# Patient Record
Sex: FEMALE | Race: AMERICAN INDIAN OR ALASKA NATIVE
[De-identification: names, ages, dates, MRNs, and addresses within clinical notes are randomized per-mention and may not be internally consistent; named-entity substitution may affect disease eponyms.]

---

## 2020-09-11 NOTE — XRAY REPORT
CHEST 2 VIEWS 



INDICATION:  COUGH.



COMPARISON:  7/10/2018



FINDINGS:

Support devices: None.



Heart: Within normal limits. 

Lungs/pleura: Interstitial markings are slightly prominent bilaterally particularly in the lung bases
 suggesting mild fibrotic changes.  No infiltrate, pleural effusion or pneumothorax.



Additional findings: None.



IMPRESSION:

Mild fibrotic changes as described. No acute process.



Signer Name: Aki Cruz Jr, MD 

Signed: 9/11/2020 3:57 PM

Workstation Name: [id number]

## 2021-07-26 NOTE — XRAY REPORT
CHEST 2 VIEWS 



INDICATION / CLINICAL INFORMATION: PULMONARY FIBROSIS.



COMPARISON: 09/11/20



FINDINGS:



SUPPORT DEVICES: None.

HEART / MEDIASTINUM: Stable. 

LUNGS / PLEURA: Bibasilar pulmonary fibrosis is unchanged. No acute airspace disease. No pneumothorax
. 



ADDITIONAL FINDINGS: No significant additional findings.



IMPRESSION:

1. Bibasilar pulmonary fibrosis appears stable. No change.



Signer Name: JAVIER Nguyen MD 

Signed: 7/26/2021 12:31 PM

Workstation Name: [id number]

## 2021-07-26 NOTE — CAT SCAN REPORT
CT CHEST WITH CONTRAST



INDICATION / CLINICAL INFORMATION: PULMONARY FIBROSIS OMNI 300 100ML .



TECHNIQUE: Axial CT images were obtained through the chest after 100 cc of Omnipaque 300 IV contrast.
 All CT scans at this location are performed using CT dose reduction for ALARA by means of automated 
exposure control. 



COMPARISON: Chest radiograph dated September 11, 2020



FINDINGS:



HEART: No significant abnormality.

CORONARY ARTERY CALCIFICATION: None.

THORACIC AORTA: Mild atherosclerotic calcification without acute abnormality. 

MEDIASTINUM / TRINIDAD: No significant abnormality.

PLEURA: No pleural effusion. No pneumothorax.

LUNGS: There is increased reticular opacities throughout the bilateral lung bases with confluent grou
ndglass opacities. There is subpleural sparing.



ADDITIONAL FINDINGS: None.



UPPER ABDOMEN: No significant abnormality.



SKELETAL SYSTEM: No significant abnormality.



IMPRESSION:

1. Increased reticular opacities with a lower lobe predominance suggesting pulmonary fibrosis (nonspe
cific interstitial pneumonia favored). Underlying infectious process is not excluded.



Signer Name: Warren Gordillo DO 

Signed: 7/26/2021 1:22 PM

Workstation Name: [email or web address]

## 2022-06-20 ENCOUNTER — HOSPITAL ENCOUNTER (OUTPATIENT)
Dept: HOSPITAL 5 - XRAY | Age: 72
Discharge: HOME | End: 2022-06-20
Attending: INTERNAL MEDICINE
Payer: MEDICARE

## 2022-06-20 DIAGNOSIS — R07.89: ICD-10-CM

## 2022-06-20 DIAGNOSIS — J84.9: Primary | ICD-10-CM

## 2022-06-20 PROCEDURE — 71046 X-RAY EXAM CHEST 2 VIEWS: CPT

## 2022-06-20 NOTE — XRAY REPORT
CHEST PA AND LATERAL VIEWS



INDICATION: 

R07.89 LEFT-SIDED CHEST WALL PAIN.



COMPARISON: 

7/26/2021



FINDINGS:



Support devices: None.

Heart: Within normal limits. 

Lungs/Pleura: Interstitial lung disease is again noted greatest in the bases. No superimposed acute p
ulmonary or pleural findings.  



No left-sided rib fractures are identified.



IMPRESSION:

1. No acute findings.



Signer Name: Ashish Montesinos MD 

Signed: 6/20/2022 2:25 PM

Workstation Name: [id number]